# Patient Record
Sex: MALE | Race: WHITE | NOT HISPANIC OR LATINO | Employment: OTHER | URBAN - METROPOLITAN AREA
[De-identification: names, ages, dates, MRNs, and addresses within clinical notes are randomized per-mention and may not be internally consistent; named-entity substitution may affect disease eponyms.]

---

## 2018-01-03 PROBLEM — Z86.73 HISTORY OF CVA IN ADULTHOOD: Status: ACTIVE | Noted: 2018-01-03

## 2018-01-03 PROBLEM — F41.1 GAD (GENERALIZED ANXIETY DISORDER): Status: ACTIVE | Noted: 2018-01-03

## 2018-01-03 PROBLEM — I25.10 CARDIOVASCULAR DISEASE: Status: ACTIVE | Noted: 2018-01-03

## 2018-01-03 PROBLEM — F32.A DEPRESSION: Status: ACTIVE | Noted: 2018-01-03

## 2018-01-03 PROBLEM — M19.019 OSTEOARTHRITIS OF SHOULDER: Status: ACTIVE | Noted: 2018-01-03

## 2018-01-03 PROBLEM — K21.9 GASTROESOPHAGEAL REFLUX DISEASE: Status: ACTIVE | Noted: 2018-01-03

## 2018-01-03 PROBLEM — E78.5 HYPERLIPIDEMIA: Status: ACTIVE | Noted: 2018-01-03

## 2018-01-03 PROBLEM — G47.33 OSA (OBSTRUCTIVE SLEEP APNEA): Status: ACTIVE | Noted: 2018-01-03

## 2018-01-03 PROBLEM — N40.1 HYPERPLASIA OF PROSTATE WITH LOWER URINARY TRACT SYMPTOMS (LUTS): Status: ACTIVE | Noted: 2018-01-03

## 2018-07-17 PROBLEM — Z95.5 PRESENCE OF STENT IN CORONARY ARTERY: Status: ACTIVE | Noted: 2018-07-17

## 2018-07-17 PROBLEM — F32.A DEPRESSION: Status: RESOLVED | Noted: 2018-01-03 | Resolved: 2018-07-17

## 2023-06-05 PROBLEM — Z71.89 ACP (ADVANCE CARE PLANNING): Status: ACTIVE | Noted: 2023-06-05

## 2023-06-05 PROBLEM — I10 HTN (HYPERTENSION): Status: ACTIVE | Noted: 2018-01-03

## 2023-06-05 PROBLEM — R45.851 SUICIDAL IDEATION: Status: ACTIVE | Noted: 2023-06-05

## 2023-06-05 PROBLEM — E87.1 HYPONATREMIA: Status: ACTIVE | Noted: 2023-06-05

## 2024-08-27 ENCOUNTER — CLINICAL SUPPORT (OUTPATIENT)
Dept: AUDIOLOGY | Facility: CLINIC | Age: 79
End: 2024-08-27
Payer: MEDICARE

## 2024-08-27 ENCOUNTER — OFFICE VISIT (OUTPATIENT)
Dept: OTOLARYNGOLOGY | Facility: CLINIC | Age: 79
End: 2024-08-27
Payer: MEDICARE

## 2024-08-27 VITALS — HEIGHT: 68 IN | WEIGHT: 176.38 LBS | BODY MASS INDEX: 26.73 KG/M2

## 2024-08-27 DIAGNOSIS — H93.13 TINNITUS OF BOTH EARS: ICD-10-CM

## 2024-08-27 DIAGNOSIS — H91.90 HEARING LOSS, UNSPECIFIED HEARING LOSS TYPE, UNSPECIFIED LATERALITY: Primary | ICD-10-CM

## 2024-08-27 DIAGNOSIS — H91.90 HEARING LOSS, UNSPECIFIED HEARING LOSS TYPE, UNSPECIFIED LATERALITY: ICD-10-CM

## 2024-08-27 DIAGNOSIS — H90.3 SENSORINEURAL HEARING LOSS (SNHL) OF BOTH EARS: Primary | ICD-10-CM

## 2024-08-27 PROCEDURE — 1101F PT FALLS ASSESS-DOCD LE1/YR: CPT | Mod: CPTII,S$GLB,, | Performed by: STUDENT IN AN ORGANIZED HEALTH CARE EDUCATION/TRAINING PROGRAM

## 2024-08-27 PROCEDURE — 99204 OFFICE O/P NEW MOD 45 MIN: CPT | Mod: S$GLB,,, | Performed by: STUDENT IN AN ORGANIZED HEALTH CARE EDUCATION/TRAINING PROGRAM

## 2024-08-27 PROCEDURE — 99999 PR PBB SHADOW E&M-EST. PATIENT-LVL II: CPT | Mod: PBBFAC,,, | Performed by: AUDIOLOGIST

## 2024-08-27 PROCEDURE — 3288F FALL RISK ASSESSMENT DOCD: CPT | Mod: CPTII,S$GLB,, | Performed by: STUDENT IN AN ORGANIZED HEALTH CARE EDUCATION/TRAINING PROGRAM

## 2024-08-27 PROCEDURE — 1126F AMNT PAIN NOTED NONE PRSNT: CPT | Mod: CPTII,S$GLB,, | Performed by: STUDENT IN AN ORGANIZED HEALTH CARE EDUCATION/TRAINING PROGRAM

## 2024-08-27 PROCEDURE — 92557 COMPREHENSIVE HEARING TEST: CPT | Mod: S$GLB,,, | Performed by: AUDIOLOGIST

## 2024-08-27 PROCEDURE — 1159F MED LIST DOCD IN RCRD: CPT | Mod: CPTII,S$GLB,, | Performed by: STUDENT IN AN ORGANIZED HEALTH CARE EDUCATION/TRAINING PROGRAM

## 2024-08-27 PROCEDURE — 92567 TYMPANOMETRY: CPT | Mod: S$GLB,,, | Performed by: AUDIOLOGIST

## 2024-08-27 PROCEDURE — 99999 PR PBB SHADOW E&M-EST. PATIENT-LVL III: CPT | Mod: PBBFAC,,, | Performed by: STUDENT IN AN ORGANIZED HEALTH CARE EDUCATION/TRAINING PROGRAM

## 2024-08-27 NOTE — PROGRESS NOTES
Otolaryngology Clinic Note    Subjective:       Patient ID: Jesse Mi is a 79 y.o. male.    Chief Complaint: Hearing Loss      History of Present Illness: Jesse Mi is a 79 y.o. male presenting with gradual hearing changes. Has ringing in both ears all the time. Is used to this. [facility], is not wearing them. Has had for 8 years. Wears to watch tv/movie. Not seeming to help. Things are loud but not clear. Issue in restaurants as well with multiple speakers. Last adjusted a year ago. Was told his hearing aids were good and were not adjusted when last seen.   Noise exposure through . No vertigo.       Past Surgical History:   Procedure Laterality Date    BACK SURGERY      cardiac stents      FINGER SURGERY Right 05/06/2022    right index finder    INSERTION OF IMPLANTABLE LOOP RECORDER      March 2023     KNEE ARTHROSCOPY      LUMBAR LAMINECTOMY      ROTATOR CUFF REPAIR Right 2016    TONSILLECTOMY, ADENOIDECTOMY       Past Medical History:   Diagnosis Date    Back pain     CAD (coronary artery disease)     CVA, old, hemiparesis     Depression     Dysphagia     GERD (gastroesophageal reflux disease)     History of chicken pox     Hyperglycemia     Hyperlipidemia     Knee pain     Late effects of cerebrovascular disease     Memory loss     Neuralgia and neuritis     LUIS E on CPAP     auto Pap 4-20cm    Post traumatic stress disorder (PTSD)     Ringing in ears     RLS (restless legs syndrome)     Shoulder pain     Stroke      Social Determinants of Health     Tobacco Use: Low Risk  (8/27/2024)    Patient History     Smoking Tobacco Use: Never     Smokeless Tobacco Use: Never     Passive Exposure: Not on file   Alcohol Use: Not At Risk (8/20/2024)    AUDIT-C     Frequency of Alcohol Consumption: Never     Average Number of Drinks: Patient does not drink     Frequency of Binge Drinking: Never   Financial Resource Strain: Low Risk  (8/20/2024)    Overall Financial Resource  Strain (CARDIA)     Difficulty of Paying Living Expenses: Not hard at all   Food Insecurity: No Food Insecurity (8/20/2024)    Hunger Vital Sign     Worried About Running Out of Food in the Last Year: Never true     Ran Out of Food in the Last Year: Never true   Transportation Needs: Not on file   Physical Activity: Sufficiently Active (8/20/2024)    Exercise Vital Sign     Days of Exercise per Week: 3 days     Minutes of Exercise per Session: 60 min   Stress: Stress Concern Present (8/20/2024)    [employer] [employer] - Occupational Stress Questionnaire     Feeling of Stress : To some extent   Housing Stability: Unknown (8/20/2024)    Housing Stability Vital Sign     Unable to Pay for Housing in the Last Year: No     Homeless in the Last Year: Not on file   Depression: Low Risk  (2/3/2023)    Depression     Last PHQ-4: Flowsheet Data: 0   Utilities: Not on file   Health Literacy: Adequate Health Literacy (8/20/2024)     Health Literacy     Frequency of need for help with medical instructions: Never   Social Isolation: Not on file     Review of patient's allergies indicates:   Allergen Reactions    Sulfa (sulfonamide antibiotics) Other (See Comments)     Unknown       Hydrocodone-acetaminophen      Other reaction(s): Xerostomia    Oxycodone-acetaminophen     Sulfur dioxide Hives    Tramadol      Other reaction(s): Xerostomia    Zolpidem tartrate     Zolpidem Nausea And Vomiting     Other reaction(s): Drowsy     Current Outpatient Medications   Medication Instructions    ALPRAZolam (XANAX) 0.25 MG tablet TAKE 1 TABLET BY MOUTH NIGHTLY AS NEEDED FOR ANXIETY    ALPRAZolam (XANAX) 0.25 MG tablet 1 tablet, Oral, Nightly PRN    apixaban (ELIQUIS) 5 mg, Oral, 2 times daily    atenoloL (TENORMIN) 25 mg, Oral, Daily    diclofenac sodium (SOLARAZE) 3 % gel 1 application , Topical (Top), As needed (PRN)    fluticasone propionate (FLONASE) 50 mcg/actuation nasal spray 2 sprays, Each Nostril, 2 times  daily PRN    metronidazole 0.75% (METROCREAM) 0.75 % Crea 1 application , Topical (Top), 2 times daily    omeprazole (PRILOSEC) 20 mg, Oral, Daily    ondansetron (ZOFRAN) 8 mg, Oral, Every 12 hours PRN    rosuvastatin (CRESTOR) 40 MG Tab 1 tablet, Oral, Nightly    tretinoin (RETIN-A) 0.05 % cream 1 application , Topical (Top), Nightly    triamcinolone acetonide 0.1% (KENALOG) 0.1 % cream As needed (PRN)         ENT ROS negative except as stated above.     Patient answers are not available for this visit.            Objective:      There were no vitals filed for this visit.    General: NAD, well appearing  Eyes: Normal conjunctiva and lids  Face: symmetric, nerve intact  Nose: The nose is without any evidence of any deformity. The nasal mucosa is moist. The septum is midline. There is no evidence of septal hematoma. The turbinates are without abnormality.   Ears: The ears are with normal-appearing pinna. Examination of the canals is normal appearing bilaterally. There is no drainage or erythema noted. The tympanic membranes are normal appearing with pearly color, normal-appearing landmarks and normal light reflex. Hearing is grossly intact.  Mouth: No obvious abnormalities to the lips. The teeth are unremarkable. The gingivae are without any obvious evidence of infection or lesion. The oral mucosa is moist and pink. There are no obvious masses to the hard or soft palate.   Oropharynx: The uvula is midline.  The tongue is midline. The posterior pharynx is without erythema or exudate. The tonsils are normal appearing.  Salivary glands: The salivary glands are symmetric and not enlarged, no masses  Neck: No lymphadenopathy, trachea midline, thryoid not enlarged.  Psych: Normal mood and affect.   Neuro: Grossly intact  Speech: fluent    Test Review: I personally reviewed audiogram       Assessment and Plan:       1. Sensorineural hearing loss (SNHL) of both ears    2. Hearing loss, unspecified hearing loss type,  unspecified laterality    3. Tinnitus of both ears          [facility] and [facility] or new hearing aids. Reviewed his test. Should be able to get good benefit with hearing aids. Wear more often to keep connections in brain sharp. May also help with tinnitus.     RTC: PRN    Plan of care was discussed in detail with the patient, who agreed with the plan as above. All questions were answered in detail.     Teagan Hoyt MD  Otolaryngology

## 2024-08-27 NOTE — PROGRESS NOTES
The patient was referred by Dr. Teagan Hoyt for a hearing evaluation.    Report from the patient was as follows:    Difficulty Hearing/Understanding - last hearing [facility] over a year ago at [facility], has a history of bilateral hearing loss and has hearing aid for each ear which he [facility] about eight years ago  Tinnitus - AU  History of Loud Noise Exposure -   Family History of Hearing Loss - negative    Otoscopic screening revealed a clear view of TM AU    A hearing evaluation was performed today. Test results indicated a mild to severe sensorineural hearing loss bilaterally. Impedance testing showed a Type A tympanogram in the left ear and a Type Ad tympanogram in the right ear.     The recommendations were as follows:    (1)  Continued use of binaural amplification. Patient reported he would like to get new hearing aids here and scheduled a hearing aid consult.  (2)  Ear protection in loud noise   (3)  Hearing evaluation in one year or sooner if hearing decrease is noted       Today's test results and recommendations were discussed with the patient.